# Patient Record
Sex: MALE
[De-identification: names, ages, dates, MRNs, and addresses within clinical notes are randomized per-mention and may not be internally consistent; named-entity substitution may affect disease eponyms.]

---

## 2019-06-26 PROBLEM — Z00.00 ENCOUNTER FOR PREVENTIVE HEALTH EXAMINATION: Status: ACTIVE | Noted: 2019-06-26

## 2019-07-09 ENCOUNTER — FORM ENCOUNTER (OUTPATIENT)
Age: 39
End: 2019-07-09

## 2019-07-10 ENCOUNTER — APPOINTMENT (OUTPATIENT)
Dept: ORTHOPEDIC SURGERY | Facility: CLINIC | Age: 39
End: 2019-07-10
Payer: MEDICAID

## 2019-07-10 ENCOUNTER — OUTPATIENT (OUTPATIENT)
Dept: OUTPATIENT SERVICES | Facility: HOSPITAL | Age: 39
LOS: 1 days | End: 2019-07-10
Payer: MEDICAID

## 2019-07-10 DIAGNOSIS — G89.29 PAIN IN RIGHT KNEE: ICD-10-CM

## 2019-07-10 DIAGNOSIS — M25.561 PAIN IN RIGHT KNEE: ICD-10-CM

## 2019-07-10 DIAGNOSIS — M25.562 PAIN IN RIGHT KNEE: ICD-10-CM

## 2019-07-10 PROCEDURE — 73562 X-RAY EXAM OF KNEE 3: CPT | Mod: 26,LT,RT

## 2019-07-10 PROCEDURE — 73562 X-RAY EXAM OF KNEE 3: CPT

## 2019-07-10 PROCEDURE — 99203 OFFICE O/P NEW LOW 30 MIN: CPT

## 2019-07-12 NOTE — PHYSICAL EXAM
[de-identified] : Physical examination of bilateral knees demonstrates no significant effusion. Patient has 0-100° of flexion but states he has pain from 70° on. His pain is diffuse in nature. He does have pain over the medial joint line of the left knee and lateral joint line of the right knee. He is stable to varus and valgus stress of both knees, with good endpoints. He is negative her Lachman exam. Of note his entire exam is difficult because the patient is extremely unreliable [de-identified] : AP, lateral, sunrise views of bilateral knees demonstrates the patient has well-maintained joint spaces. No significant arthrosis is present

## 2019-07-12 NOTE — HISTORY OF PRESENT ILLNESS
[de-identified] : This is a 38 oral male with greater than 6 months of bilateral knee pain. Patient is only [place]ian speaking and seems extremely loose during conversation. Patient has denied any trauma or inciting event for both his knees. He does state that he is currently undergoing treatment for heroin and opiate use. The patient states that he is bilateral knee pain that is diffuse in nature. He does also have low back pain but says it's not related. He is unable to tell me exactly where the pain is in the knee does state that the pain occurs when he bends his knee down past 60°.He states that he does have symptoms of clicking as well as some locking symptoms. He denies any severe swelling around the knee and desires any pain around his hip or ankle.

## 2019-07-12 NOTE — ASSESSMENT
[FreeTextEntry1] : Assessment: This is a 38-year-old male with bilateral knee pain both second secondary to internal pathology of any\par Plan: We will get bilateral MRIs of the knee, patient is going to take anti-inflammatory medications as needed, and he'll followup after the MRI.....

## 2019-08-01 ENCOUNTER — FORM ENCOUNTER (OUTPATIENT)
Age: 39
End: 2019-08-01

## 2019-08-02 ENCOUNTER — APPOINTMENT (OUTPATIENT)
Dept: MRI IMAGING | Facility: HOSPITAL | Age: 39
End: 2019-08-02
Payer: MEDICAID

## 2019-08-02 ENCOUNTER — OUTPATIENT (OUTPATIENT)
Dept: OUTPATIENT SERVICES | Facility: HOSPITAL | Age: 39
LOS: 1 days | End: 2019-08-02
Payer: MEDICAID

## 2019-08-02 PROCEDURE — 73721 MRI JNT OF LWR EXTRE W/O DYE: CPT | Mod: 26,LT,76

## 2019-08-02 PROCEDURE — 73721 MRI JNT OF LWR EXTRE W/O DYE: CPT

## 2021-02-02 ENCOUNTER — OUTPATIENT (OUTPATIENT)
Dept: OUTPATIENT SERVICES | Facility: HOSPITAL | Age: 41
LOS: 1 days | Discharge: HOME | End: 2021-02-02

## 2021-02-02 DIAGNOSIS — F11.20 OPIOID DEPENDENCE, UNCOMPLICATED: ICD-10-CM
